# Patient Record
Sex: MALE | Race: WHITE | Employment: FULL TIME | URBAN - METROPOLITAN AREA
[De-identification: names, ages, dates, MRNs, and addresses within clinical notes are randomized per-mention and may not be internally consistent; named-entity substitution may affect disease eponyms.]

---

## 2023-05-18 ENCOUNTER — TELEPHONE (OUTPATIENT)
Age: 58
End: 2023-05-18

## 2023-05-23 ENCOUNTER — OFFICE VISIT (OUTPATIENT)
Age: 58
End: 2023-05-23
Payer: COMMERCIAL

## 2023-05-23 VITALS
SYSTOLIC BLOOD PRESSURE: 126 MMHG | RESPIRATION RATE: 20 BRPM | DIASTOLIC BLOOD PRESSURE: 82 MMHG | OXYGEN SATURATION: 97 % | HEIGHT: 70 IN | HEART RATE: 63 BPM | BODY MASS INDEX: 24.77 KG/M2 | TEMPERATURE: 97.1 F | WEIGHT: 173 LBS

## 2023-05-23 DIAGNOSIS — R10.13 EPIGASTRIC PAIN: Primary | ICD-10-CM

## 2023-05-23 PROCEDURE — 99243 OFF/OP CNSLTJ NEW/EST LOW 30: CPT | Performed by: SURGERY

## 2023-05-23 RX ORDER — ESCITALOPRAM OXALATE 5 MG/1
5 TABLET ORAL DAILY
COMMUNITY
Start: 2023-03-30

## 2023-05-23 NOTE — PROGRESS NOTES
Identified pt with two pt identifiers(name and ). Reviewed record in preparation for visit and have obtained necessary documentation. All patient medications has been reviewed. Chief Complaint   Patient presents with    Abdominal Pain     Seen at the request of Dr. Bennett hoyos gallbladder       Health Maintenance Due   Topic    COVID-19 Vaccine (1)    Depression Screen     HIV screen     Hepatitis C screen     DTaP/Tdap/Td vaccine (1 - Tdap)    Lipids     Colorectal Cancer Screen     Shingles vaccine (1 of 2)       [unfilled]    4. Have you been to the ER, urgent care clinic since your last visit? Hospitalized since your last visit? yes    5. Have you seen or consulted any other health care providers outside of the [address] [place] [address] since your last visit? Include any pap smears or colon screening. no      Patient is accompanied by self I have received verbal consent from Franky Rodriguez to discuss any/all medical information while they are present in the room.

## 2023-05-23 NOTE — PROGRESS NOTES
To: Linnette Pepper MD      From: Darryl Bennett MD    Thank you for sending Michela Denney to see us. Please note that this dictation was completed with [employer], the computer voice recognition software. Quite often unanticipated grammatical, syntax, homophones, and other interpretive errors are inadvertently transcribed by the software. Please disregard these errors. Please excuse any errors that have escaped final proofreading. Encounter Date: 5/23/2023  History and Physical    Assessment/Plan/Recommendations/Medical Decision Making:   Possible subacute cholecystitis. He had a CT done that showed a distended gallbladder with minimal pericholecystic fatty infiltration but no calcified gallstones were seen. We discussed the possibility of going straight to cholecystectomy based on the CAT scan. Other options include doing an ultrasound first to confirm that there are gallstones versus watchful waiting. He opted for an ultrasound prior to surgery. Cholecystectomy with intraoperative cholangiogram.  Discussed alternatives, risks and benefits of surgery. Risks include infection, hematoma, bleeding, bile duct or bowel injury, recurrence or persistence of symptoms, conversion to an open operation, retained CBD stones, possible FAROOQ drain, and the risks of general anesthetic. All questions were answered to the patient's satisfaction. Body mass index is 24.82 kg/m². HPI:   Patient is a 56 y.o. male who is seen in consultation at the request of Linnette Pepper MD.   Notes he has been having abdominal pain for about 3 months. He notes that last year he fell 30 feet and fortunately did not have any major injuries but did have an Achilles issue. He took a lot of ibuprofen at that point and began having some stomach issues. He later got a frozen shoulder on the right and was taking ibuprofen a lot again. Again he had issues with abdominal discomfort. He has noted some association with meals.   It

## 2023-06-08 DIAGNOSIS — R10.11 RUQ PAIN: Primary | ICD-10-CM

## 2023-06-19 ENCOUNTER — HOSPITAL ENCOUNTER (OUTPATIENT)
Facility: HOSPITAL | Age: 58
Discharge: HOME | End: 2023-06-22
Attending: SURGERY
Payer: COMMERCIAL

## 2023-06-19 DIAGNOSIS — R10.11 RUQ PAIN: ICD-10-CM

## 2023-06-19 PROCEDURE — 76700 US EXAM ABDOM COMPLETE: CPT

## 2023-06-28 ENCOUNTER — TELEPHONE (OUTPATIENT)
Age: 58
End: 2023-06-28

## 2024-11-16 ENCOUNTER — APPOINTMENT (OUTPATIENT)
Facility: HOSPITAL | Age: 59
End: 2024-11-16
Payer: COMMERCIAL

## 2024-11-16 ENCOUNTER — HOSPITAL ENCOUNTER (EMERGENCY)
Facility: HOSPITAL | Age: 59
Discharge: HOME | End: 2024-11-16
Attending: STUDENT IN AN ORGANIZED HEALTH CARE EDUCATION/TRAINING PROGRAM
Payer: COMMERCIAL

## 2024-11-16 VITALS
HEART RATE: 78 BPM | SYSTOLIC BLOOD PRESSURE: 131 MMHG | OXYGEN SATURATION: 97 % | TEMPERATURE: 97.8 F | RESPIRATION RATE: 16 BRPM | DIASTOLIC BLOOD PRESSURE: 80 MMHG

## 2024-11-16 DIAGNOSIS — S01.81XA FACIAL LACERATION, INITIAL ENCOUNTER: Primary | ICD-10-CM

## 2024-11-16 DIAGNOSIS — S09.90XD CLOSED HEAD INJURY, SUBSEQUENT ENCOUNTER: ICD-10-CM

## 2024-11-16 PROCEDURE — 2500000003 HC RX 250 WO HCPCS: Performed by: STUDENT IN AN ORGANIZED HEALTH CARE EDUCATION/TRAINING PROGRAM

## 2024-11-16 PROCEDURE — 70450 CT HEAD/BRAIN W/O DYE: CPT

## 2024-11-16 PROCEDURE — 72125 CT NECK SPINE W/O DYE: CPT

## 2024-11-16 PROCEDURE — 99284 EMERGENCY DEPT VISIT MOD MDM: CPT

## 2024-11-16 PROCEDURE — 12013 RPR F/E/E/N/L/M 2.6-5.0 CM: CPT

## 2024-11-16 RX ORDER — LIDOCAINE HYDROCHLORIDE 10 MG/ML
5 INJECTION, SOLUTION EPIDURAL; INFILTRATION; INTRACAUDAL; PERINEURAL
Status: COMPLETED | OUTPATIENT
Start: 2024-11-16 | End: 2024-11-16

## 2024-11-16 RX ADMIN — LIDOCAINE HYDROCHLORIDE 5 ML: 10 INJECTION, SOLUTION EPIDURAL; INFILTRATION; INTRACAUDAL; PERINEURAL at 14:43

## 2024-11-16 ASSESSMENT — PAIN - FUNCTIONAL ASSESSMENT: PAIN_FUNCTIONAL_ASSESSMENT: NONE - DENIES PAIN

## 2024-11-16 NOTE — DISCHARGE INSTR - COC
recorded.    Safety Concerns:     { [facility] Safety Concerns:167635163}    Impairments/Disabilities:      {[facility] Impairments/Disabilities:793474328}    Nutrition Therapy:  Current Nutrition Therapy:   {[facility] Diet List:123072029}    Routes of Feeding: {[facility] Other Feedings:610850066}  Liquids: {Slp liquid thickness:39027}  Daily Fluid Restriction: {[facility] Yes amt example:532301624}  Last Modified Barium Swallow with Video (Video Swallowing Test): {Done Not Done Date:}    Treatments at the Time of Hospital Discharge:   Respiratory Treatments: ***  Oxygen Therapy:  {Therapy; copd oxygen:06901}  Ventilator:    {[facility] Vent List:080731596}    Rehab Therapies: {THERAPEUTIC INTERVENTION:4459037833}  Weight Bearing Status/Restrictions: {[facility] Weight Bearin}  Other Medical Equipment (for information only, NOT a DME order):  {EQUIPMENT:526235556}  Other Treatments: ***    Patient's personal belongings (please select all that are sent with patient):  {[facility] Belongings:955078957}    RN SIGNATURE:  {Esignature:474860508}    CASE MANAGEMENT/SOCIAL WORK SECTION    Inpatient Status Date: ***    Readmission Risk Assessment Score:  Readmission Risk              Risk of Unplanned Readmission:  0           Discharging to Facility/ Agency   Name:   Address:  Phone:  Fax:    Dialysis Facility (if applicable)   Name:  Address:  Dialysis Schedule:  Phone:  Fax:    / signature: {Esignature:941563048}    PHYSICIAN SECTION    Prognosis: {Prognosis:5591421082}    Condition at Discharge: { Patient Condition:015477249}    Rehab Potential (if transferring to Rehab): {Prognosis:6851748745}    Recommended Labs or Other Treatments After Discharge: ***    Physician Certification: I certify the above information and transfer of Jude Mckeon  is necessary for the continuing treatment of the diagnosis listed and that he requires {Admit to Appropriate Level of Care:42648} for {GREATER/LESS:801091629} 30

## 2024-11-16 NOTE — ED NOTES
Patient gave verbal understanding of dc paperwork and how to care for stitches/wounds. Patient ambulated from ed without incident.
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
## 2025-05-12 ENCOUNTER — APPOINTMENT (OUTPATIENT)
Facility: HOSPITAL | Age: 60
DRG: 392 | End: 2025-05-12
Payer: COMMERCIAL

## 2025-05-12 ENCOUNTER — HOSPITAL ENCOUNTER (INPATIENT)
Facility: HOSPITAL | Age: 60
LOS: 3 days | Discharge: HOME | DRG: 392 | End: 2025-05-15
Attending: EMERGENCY MEDICINE | Admitting: INTERNAL MEDICINE
Payer: COMMERCIAL

## 2025-05-12 DIAGNOSIS — K57.80 DIVERTICULITIS OF INTESTINE WITH ABSCESS WITHOUT BLEEDING, UNSPECIFIED PART OF INTESTINAL TRACT: Primary | ICD-10-CM

## 2025-05-12 DIAGNOSIS — D72.829 LEUKOCYTOSIS, UNSPECIFIED TYPE: ICD-10-CM

## 2025-05-12 PROBLEM — K57.32 DIVERTICULITIS LARGE INTESTINE: Status: ACTIVE | Noted: 2025-05-12

## 2025-05-12 PROBLEM — K57.20 DIVERTICULITIS OF LARGE INTESTINE WITH ABSCESS WITHOUT BLEEDING: Status: ACTIVE | Noted: 2025-05-12

## 2025-05-12 LAB
ALBUMIN SERPL-MCNC: 3.6 G/DL (ref 3.5–5)
ALBUMIN/GLOB SERPL: 0.8 (ref 1.1–2.2)
ALP SERPL-CCNC: 88 U/L (ref 45–117)
ALT SERPL-CCNC: 21 U/L (ref 12–78)
ANION GAP SERPL CALC-SCNC: 4 MMOL/L (ref 2–12)
APPEARANCE UR: CLEAR
AST SERPL-CCNC: 20 U/L (ref 15–37)
BACTERIA URNS QL MICRO: NEGATIVE /HPF
BASOPHILS # BLD: 0.05 K/UL (ref 0–0.1)
BASOPHILS NFR BLD: 0.3 % (ref 0–1)
BILIRUB SERPL-MCNC: 1 MG/DL (ref 0.2–1)
BILIRUB UR QL: NEGATIVE
BUN SERPL-MCNC: 9 MG/DL (ref 6–20)
BUN/CREAT SERPL: 8 (ref 12–20)
CALCIUM SERPL-MCNC: 9.5 MG/DL (ref 8.5–10.1)
CHLORIDE SERPL-SCNC: 99 MMOL/L (ref 97–108)
CO2 SERPL-SCNC: 29 MMOL/L (ref 21–32)
COLOR UR: ABNORMAL
COMMENT:: NORMAL
CREAT SERPL-MCNC: 1.11 MG/DL (ref 0.7–1.3)
DIFFERENTIAL METHOD BLD: ABNORMAL
EOSINOPHIL # BLD: 0.06 K/UL (ref 0–0.4)
EOSINOPHIL NFR BLD: 0.4 % (ref 0–7)
EPITH CASTS URNS QL MICRO: ABNORMAL /LPF
ERYTHROCYTE [DISTWIDTH] IN BLOOD BY AUTOMATED COUNT: 11.9 % (ref 11.5–14.5)
GLOBULIN SER CALC-MCNC: 4.5 G/DL (ref 2–4)
GLUCOSE BLD STRIP.AUTO-MCNC: 110 MG/DL (ref 65–117)
GLUCOSE SERPL-MCNC: 94 MG/DL (ref 65–100)
GLUCOSE UR STRIP.AUTO-MCNC: NEGATIVE MG/DL
HCT VFR BLD AUTO: 44.8 % (ref 36.6–50.3)
HEMOCCULT STL QL: NEGATIVE
HGB BLD-MCNC: 15.6 G/DL (ref 12.1–17)
HGB UR QL STRIP: ABNORMAL
HYALINE CASTS URNS QL MICRO: ABNORMAL /LPF (ref 0–5)
IMM GRANULOCYTES # BLD AUTO: 0.07 K/UL (ref 0–0.04)
IMM GRANULOCYTES NFR BLD AUTO: 0.5 % (ref 0–0.5)
KETONES UR QL STRIP.AUTO: 80 MG/DL
LEUKOCYTE ESTERASE UR QL STRIP.AUTO: NEGATIVE
LIPASE SERPL-CCNC: 23 U/L (ref 13–75)
LYMPHOCYTES # BLD: 1.39 K/UL (ref 0.8–3.5)
LYMPHOCYTES NFR BLD: 9.7 % (ref 12–49)
MCH RBC QN AUTO: 29.7 PG (ref 26–34)
MCHC RBC AUTO-ENTMCNC: 34.8 G/DL (ref 30–36.5)
MCV RBC AUTO: 85.2 FL (ref 80–99)
MONOCYTES # BLD: 1.53 K/UL (ref 0–1)
MONOCYTES NFR BLD: 10.6 % (ref 5–13)
NEUTS SEG # BLD: 11.29 K/UL (ref 1.8–8)
NEUTS SEG NFR BLD: 78.5 % (ref 32–75)
NITRITE UR QL STRIP.AUTO: NEGATIVE
NRBC # BLD: 0 K/UL (ref 0–0.01)
NRBC BLD-RTO: 0 PER 100 WBC
PH UR STRIP: 6 (ref 5–8)
PLATELET # BLD AUTO: 266 K/UL (ref 150–400)
PMV BLD AUTO: 9.3 FL (ref 8.9–12.9)
POTASSIUM SERPL-SCNC: 3.8 MMOL/L (ref 3.5–5.1)
PROT SERPL-MCNC: 8.1 G/DL (ref 6.4–8.2)
PROT UR STRIP-MCNC: ABNORMAL MG/DL
RBC # BLD AUTO: 5.26 M/UL (ref 4.1–5.7)
RBC #/AREA URNS HPF: ABNORMAL /HPF (ref 0–5)
SERVICE CMNT-IMP: NORMAL
SODIUM SERPL-SCNC: 132 MMOL/L (ref 136–145)
SP GR UR REFRACTOMETRY: 1.02 (ref 1–1.03)
SPECIMEN HOLD: NORMAL
SPECIMEN HOLD: NORMAL
UROBILINOGEN UR QL STRIP.AUTO: 0.2 EU/DL (ref 0.2–1)
WBC # BLD AUTO: 14.4 K/UL (ref 4.1–11.1)
WBC URNS QL MICRO: ABNORMAL /HPF (ref 0–4)

## 2025-05-12 PROCEDURE — 81001 URINALYSIS AUTO W/SCOPE: CPT

## 2025-05-12 PROCEDURE — 82272 OCCULT BLD FECES 1-3 TESTS: CPT

## 2025-05-12 PROCEDURE — 99221 1ST HOSP IP/OBS SF/LOW 40: CPT | Performed by: NURSE PRACTITIONER

## 2025-05-12 PROCEDURE — 2580000003 HC RX 258: Performed by: INTERNAL MEDICINE

## 2025-05-12 PROCEDURE — 89055 LEUKOCYTE ASSESSMENT FECAL: CPT

## 2025-05-12 PROCEDURE — 6360000002 HC RX W HCPCS: Performed by: INTERNAL MEDICINE

## 2025-05-12 PROCEDURE — 99285 EMERGENCY DEPT VISIT HI MDM: CPT

## 2025-05-12 PROCEDURE — 6360000002 HC RX W HCPCS: Performed by: EMERGENCY MEDICINE

## 2025-05-12 PROCEDURE — 1200000000 HC SEMI PRIVATE

## 2025-05-12 PROCEDURE — 87449 NOS EACH ORGANISM AG IA: CPT

## 2025-05-12 PROCEDURE — 74177 CT ABD & PELVIS W/CONTRAST: CPT

## 2025-05-12 PROCEDURE — 87324 CLOSTRIDIUM AG IA: CPT

## 2025-05-12 PROCEDURE — 96374 THER/PROPH/DIAG INJ IV PUSH: CPT

## 2025-05-12 PROCEDURE — 87506 IADNA-DNA/RNA PROBE TQ 6-11: CPT

## 2025-05-12 PROCEDURE — 6360000004 HC RX CONTRAST MEDICATION: Performed by: RADIOLOGY

## 2025-05-12 PROCEDURE — 80053 COMPREHEN METABOLIC PANEL: CPT

## 2025-05-12 PROCEDURE — 83690 ASSAY OF LIPASE: CPT

## 2025-05-12 PROCEDURE — 87040 BLOOD CULTURE FOR BACTERIA: CPT

## 2025-05-12 PROCEDURE — 2500000003 HC RX 250 WO HCPCS: Performed by: INTERNAL MEDICINE

## 2025-05-12 PROCEDURE — 85025 COMPLETE CBC W/AUTO DIFF WBC: CPT

## 2025-05-12 PROCEDURE — 2580000003 HC RX 258: Performed by: EMERGENCY MEDICINE

## 2025-05-12 PROCEDURE — 6370000000 HC RX 637 (ALT 250 FOR IP): Performed by: INTERNAL MEDICINE

## 2025-05-12 PROCEDURE — 82962 GLUCOSE BLOOD TEST: CPT

## 2025-05-12 RX ORDER — ONDANSETRON 2 MG/ML
4 INJECTION INTRAMUSCULAR; INTRAVENOUS EVERY 6 HOURS PRN
Status: DISCONTINUED | OUTPATIENT
Start: 2025-05-12 | End: 2025-05-15 | Stop reason: HOSPADM

## 2025-05-12 RX ORDER — SODIUM CHLORIDE 9 MG/ML
INJECTION, SOLUTION INTRAVENOUS CONTINUOUS
Status: DISPENSED | OUTPATIENT
Start: 2025-05-12 | End: 2025-05-13

## 2025-05-12 RX ORDER — POTASSIUM CHLORIDE 750 MG/1
40 TABLET, EXTENDED RELEASE ORAL PRN
Status: CANCELLED | OUTPATIENT
Start: 2025-05-12

## 2025-05-12 RX ORDER — ACETAMINOPHEN 650 MG/1
650 SUPPOSITORY RECTAL EVERY 6 HOURS PRN
Status: DISCONTINUED | OUTPATIENT
Start: 2025-05-12 | End: 2025-05-15 | Stop reason: HOSPADM

## 2025-05-12 RX ORDER — POTASSIUM CHLORIDE 7.45 MG/ML
10 INJECTION INTRAVENOUS PRN
Status: CANCELLED | OUTPATIENT
Start: 2025-05-12

## 2025-05-12 RX ORDER — ACETAMINOPHEN 325 MG/1
650 TABLET ORAL EVERY 6 HOURS PRN
Status: DISCONTINUED | OUTPATIENT
Start: 2025-05-12 | End: 2025-05-15 | Stop reason: HOSPADM

## 2025-05-12 RX ORDER — SODIUM CHLORIDE 0.9 % (FLUSH) 0.9 %
5-40 SYRINGE (ML) INJECTION PRN
Status: DISCONTINUED | OUTPATIENT
Start: 2025-05-12 | End: 2025-05-15 | Stop reason: HOSPADM

## 2025-05-12 RX ORDER — IOPAMIDOL 755 MG/ML
100 INJECTION, SOLUTION INTRAVASCULAR
Status: COMPLETED | OUTPATIENT
Start: 2025-05-12 | End: 2025-05-12

## 2025-05-12 RX ORDER — SODIUM CHLORIDE 9 MG/ML
INJECTION, SOLUTION INTRAVENOUS PRN
Status: DISCONTINUED | OUTPATIENT
Start: 2025-05-12 | End: 2025-05-15 | Stop reason: HOSPADM

## 2025-05-12 RX ORDER — ESCITALOPRAM OXALATE 10 MG/1
5 TABLET ORAL DAILY
Status: DISCONTINUED | OUTPATIENT
Start: 2025-05-13 | End: 2025-05-15 | Stop reason: HOSPADM

## 2025-05-12 RX ORDER — ONDANSETRON 4 MG/1
4 TABLET, ORALLY DISINTEGRATING ORAL EVERY 8 HOURS PRN
Status: CANCELLED | OUTPATIENT
Start: 2025-05-12

## 2025-05-12 RX ORDER — NALOXONE HYDROCHLORIDE 0.4 MG/ML
0.4 INJECTION, SOLUTION INTRAMUSCULAR; INTRAVENOUS; SUBCUTANEOUS PRN
Status: DISCONTINUED | OUTPATIENT
Start: 2025-05-12 | End: 2025-05-15 | Stop reason: HOSPADM

## 2025-05-12 RX ORDER — SODIUM CHLORIDE 0.9 % (FLUSH) 0.9 %
5-40 SYRINGE (ML) INJECTION EVERY 12 HOURS SCHEDULED
Status: DISCONTINUED | OUTPATIENT
Start: 2025-05-12 | End: 2025-05-15 | Stop reason: HOSPADM

## 2025-05-12 RX ORDER — POLYETHYLENE GLYCOL 3350 17 G/17G
17 POWDER, FOR SOLUTION ORAL DAILY PRN
Status: DISCONTINUED | OUTPATIENT
Start: 2025-05-12 | End: 2025-05-15 | Stop reason: HOSPADM

## 2025-05-12 RX ORDER — MORPHINE SULFATE 2 MG/ML
2 INJECTION, SOLUTION INTRAMUSCULAR; INTRAVENOUS EVERY 4 HOURS PRN
Refills: 0 | Status: DISCONTINUED | OUTPATIENT
Start: 2025-05-12 | End: 2025-05-15 | Stop reason: HOSPADM

## 2025-05-12 RX ORDER — MAGNESIUM SULFATE IN WATER 40 MG/ML
2000 INJECTION, SOLUTION INTRAVENOUS PRN
Status: CANCELLED | OUTPATIENT
Start: 2025-05-12

## 2025-05-12 RX ORDER — METRONIDAZOLE 500 MG/100ML
500 INJECTION, SOLUTION INTRAVENOUS EVERY 8 HOURS
Status: DISCONTINUED | OUTPATIENT
Start: 2025-05-12 | End: 2025-05-15 | Stop reason: HOSPADM

## 2025-05-12 RX ADMIN — Medication 10 ML: at 22:00

## 2025-05-12 RX ADMIN — PIPERACILLIN AND TAZOBACTAM 4500 MG: 4; .5 INJECTION, POWDER, LYOPHILIZED, FOR SOLUTION INTRAVENOUS at 14:12

## 2025-05-12 RX ADMIN — ACETAMINOPHEN 650 MG: 325 TABLET ORAL at 20:28

## 2025-05-12 RX ADMIN — METRONIDAZOLE 500 MG: 5 INJECTION, SOLUTION INTRAVENOUS at 20:34

## 2025-05-12 RX ADMIN — IOPAMIDOL 100 ML: 755 INJECTION, SOLUTION INTRAVENOUS at 12:23

## 2025-05-12 RX ADMIN — CEFTRIAXONE SODIUM 1000 MG: 1 INJECTION, POWDER, FOR SOLUTION INTRAMUSCULAR; INTRAVENOUS at 20:28

## 2025-05-12 RX ADMIN — SODIUM CHLORIDE: 0.9 INJECTION, SOLUTION INTRAVENOUS at 16:32

## 2025-05-12 ASSESSMENT — PAIN DESCRIPTION - ORIENTATION: ORIENTATION: LOWER

## 2025-05-12 ASSESSMENT — PAIN DESCRIPTION - DESCRIPTORS: DESCRIPTORS: OTHER (COMMENT)

## 2025-05-12 ASSESSMENT — PAIN SCALES - GENERAL: PAINLEVEL_OUTOF10: 2

## 2025-05-12 ASSESSMENT — PAIN DESCRIPTION - LOCATION: LOCATION: ABDOMEN

## 2025-05-12 ASSESSMENT — PAIN - FUNCTIONAL ASSESSMENT
PAIN_FUNCTIONAL_ASSESSMENT: PREVENTS OR INTERFERES SOME ACTIVE ACTIVITIES AND ADLS
PAIN_FUNCTIONAL_ASSESSMENT: 0-10

## 2025-05-12 NOTE — ED TRIAGE NOTES
Pt c/o abd pain since last week with fever and chills , seen at pt first, had diarrhea, +left lower abd pain, denies n/v, denies urinary symptoms , fever of 101

## 2025-05-12 NOTE — ED PROVIDER NOTES
[facility] EMERGENCY DEPARTMENT  EMERGENCY DEPARTMENT ENCOUNTER      Pt Name: Jude Mckeon  MRN: [id number]  Birthdate 1965  Date of evaluation: 5/12/2025  Provider: Jean Carlos Reyes DO      HISTORY OF PRESENT ILLNESS      HPI  Otherwise healthy 59-year-old male with no prior abdominal surgeries presents with increasing abdominal pain for the last week with associated fever and chills.  He reports left and right lower abdominal pain and has had some diarrhea but no nausea or vomiting or urinary symptoms.  Reports fever 101 last night.      Nursing Notes were reviewed.    REVIEW OF SYSTEMS         Review of Systems        PAST MEDICAL HISTORY     Past Medical History:   Diagnosis Date    Cancer (HCC)     skin         SURGICAL HISTORY     No past surgical history on file.      CURRENT MEDICATIONS       Previous Medications    ESCITALOPRAM (LEXAPRO) 5 MG TABLET    Take 1 tablet by mouth daily       ALLERGIES     Patient has no known allergies.    FAMILY HISTORY     No family history on file.       SOCIAL HISTORY       Social History     Socioeconomic History    Marital status:    Tobacco Use    Smoking status: Never    Smokeless tobacco: Current    Tobacco comments:     pouches   Vaping Use    Vaping status: Never Used   Substance and Sexual Activity    Alcohol use: Yes     Comment: weekends    Drug use: Never         PHYSICAL EXAM       ED Triage Vitals   BP Systolic BP Percentile Diastolic BP Percentile Temp Temp src Pulse Resp SpO2   -- -- -- -- -- -- -- --      Height Weight         -- --             Body mass index is 25.62 kg/m².    Physical Exam  Vitals and nursing note reviewed.   Constitutional:       General: He is not in acute distress.     Appearance: Normal appearance. He is not ill-appearing.   HENT:      Head: Normocephalic and atraumatic.      Nose: Nose normal.      Mouth/Throat:      Mouth: Mucous membranes are moist.   Eyes:      Extraocular Movements: Extraocular movements intact.

## 2025-05-12 NOTE — CONSULTS
[facility]  ER consult    Admit Date: 5/12/2025  Reason for Consultation: diverticulitis w/ abscess    HPI:  Jude Mckeon is a 59 y.o. male w/ no PMHx or PSHx presents to the ED w/ c/o lower abd pain for a week.  He had diarrhea last week and intermittent pain.  The pain went away on Friday and Saturday.  Yesterday he felt worse and spiked a fever.  No n/v.  No diverticulitis in past.  Colonoscopy at age 52, says it was nl.      CT  IMPRESSION:  Proximal sigmoid colonic diverticulitis. 2.4 cm x 3.9 cm coalescing  diverticular abscess, component of which is intramural      Patient Active Problem List    Diagnosis Date Noted    Diverticulitis large intestine 05/12/2025     Past Medical History:   Diagnosis Date    Cancer (HCC)     skin      No past surgical history on file.   Social History     Tobacco Use    Smoking status: Never    Smokeless tobacco: Current    Tobacco comments:     pouches   Substance Use Topics    Alcohol use: Yes     Comment: weekends      No family history on file.   Prior to Admission medications    Medication Sig Start Date End Date Taking? Authorizing Provider   escitalopram (LEXAPRO) 5 MG tablet Take 1 tablet by mouth daily 3/30/23   ProviderAlexis MD     No Known Allergies       Subjective:     Review of Systems:    A comprehensive review of systems was negative except for that written in the History of Present Illness.       Objective:     Blood pressure (!) 145/94, pulse 88, temperature 97.9 °F (36.6 °C), temperature source Oral, resp. rate 16, weight 81 kg (178 lb 9.2 oz), SpO2 98%.  Recent Results (from the past 24 hours)   CBC with Auto Differential    Collection Time: 05/12/25 11:31 AM   Result Value Ref Range    WBC 14.4 (H) 4.1 - 11.1 K/uL    RBC 5.26 4.10 - 5.70 M/uL    Hemoglobin 15.6 12.1 - 17.0 g/dL    Hematocrit 44.8 36.6 - 50.3 %    MCV 85.2 80.0 - 99.0 FL    MCH 29.7 26.0 - 34.0 PG    MCHC 34.8 30.0 - 36.5 g/dL    RDW 11.9 11.5 - 14.5 %    Platelets 266 150

## 2025-05-12 NOTE — H&P
History and Physical    Date of Service:  5/12/2025  Primary Care Provider: Mejia Leonardo MD  Source of information: The patient and Chart review    Chief Complaint: Abdominal Pain      History of Presenting Illness:   Jude Mckeon is a 59 y.o. male with depression and h/o skin CA who presented with abdominal pain, fever, chills, nonbloody diarrhea x1 week. No previous episodes, sick contacts. He had a fever of 101 yesterday. 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 He had a normal colonoscopy 7 years ago at age 52. He denies headaches, lightheadedness, dizziness, fall, injuries, LOC, weight changes, CP, SOB, cough, dysuria.      REVIEW OF SYSTEMS:  Pertinent items are noted in the History of Present Illness.     Past Medical History:   Diagnosis Date    Cancer (HCC)     skin      No past surgical history on file.  Prior to Admission medications    Medication Sig Start Date End Date Taking? Authorizing Provider   escitalopram (LEXAPRO) 5 MG tablet Take 1 tablet by mouth daily 3/30/23   Provider, MD Alexis     No Known Allergies   No family history on file.   Social History:  reports that he has never smoked. He uses smokeless tobacco. He reports current alcohol use. He reports that he does not use drugs.   Social Drivers of Health     Tobacco Use: High Risk (5/26/2023)    Patient History     Smoking Tobacco Use: Never     Smokeless Tobacco Use: Current     Passive Exposure: Not on file   Alcohol Use: Not on file   Financial Resource Strain: Not on file   Food Insecurity: Not on file   Transportation Needs: Not on file   Physical Activity: Not on file   Stress: Not on file   Social Connections: Not on file   Intimate Partner Violence: Not on file   Depression: Not on file   Housing Stability: Not on file   Interpersonal Safety: Not on file   Utilities: Not on file        Medications were reconciled to the best of my ability given all available resources at the time of admission. Route is PO

## 2025-05-12 NOTE — ED NOTES
ED TO INPATIENT SBAR HANDOFF    Patient Name: Jude Mckeon   :  1965  59 y.o.   MRN:  [id number]  ED Room #:  R35/R35     Situation  Code Status: Full Code   Allergies: Patient has no known allergies.  Weight: Patient Vitals for the past 96 hrs (Last 3 readings):   Weight   25 1124 81 kg (178 lb 9.2 oz)       Arrived from: home    Chief Complaint:   Chief Complaint   Patient presents with    Abdominal Pain       Hospital Problem/Diagnosis:  Principal Problem:    Diverticulitis of large intestine with abscess without bleeding  Active Problems:    Diverticulitis large intestine  Resolved Problems:    * No resolved hospital problems. *      Mobility: no current mobility problem   ED Fall Risk: Presents to emergency department  because of falls (Syncope, seizure, or loss of consciousness): No, Age > 70: No, Altered Mental Status, Intoxication with alcohol or substance confusion (Disorientation, impaired judgment, poor safety awaremess, or inability to follow instructions): No, Impaired Mobility: Ambulates or transfers with assistive devices or assistance; Unable to ambulate or transer.: No, Nursing Judgement: No   Fell in ED or prior to admission: no   Restraints: no     Sitter: no   Family/Caregiver Present: no    Neet to know social/safety information: None    Background  History:   Past Medical History:   Diagnosis Date    Cancer (HCC)     skin       Assessment    Abnormal Assessment Findings: Abdominal pain  Imaging:   CT ABDOMEN PELVIS W IV CONTRAST Additional Contrast? None   Final Result   Proximal sigmoid colonic diverticulitis. 2.4 cm x 3.9 cm coalescing   diverticular abscess, component of which is intramural      Electronically signed by Aaron Adler MD        Abnormal labs:   Abnormal Labs Reviewed   URINALYSIS WITH MICROSCOPIC - Abnormal; Notable for the following components:       Result Value    Protein, UA TRACE (*)     Ketones, Urine 80 (*)     Blood, Urine TRACE (*)     All other

## 2025-05-13 LAB
ANION GAP SERPL CALC-SCNC: 7 MMOL/L (ref 2–12)
BASOPHILS # BLD: 0.07 K/UL (ref 0–0.1)
BASOPHILS NFR BLD: 0.5 % (ref 0–1)
BUN SERPL-MCNC: 6 MG/DL (ref 6–20)
BUN/CREAT SERPL: 7 (ref 12–20)
C COLI+JEJUNI TUF STL QL NAA+PROBE: NEGATIVE
C DIFF GDH STL QL: NEGATIVE
C DIFF TOX A+B STL QL IA: NEGATIVE
C DIFF TOXIN INTERPRETATION: NORMAL
CALCIUM SERPL-MCNC: 9.1 MG/DL (ref 8.5–10.1)
CHLORIDE SERPL-SCNC: 105 MMOL/L (ref 97–108)
CO2 SERPL-SCNC: 23 MMOL/L (ref 21–32)
CREAT SERPL-MCNC: 0.88 MG/DL (ref 0.7–1.3)
DIFFERENTIAL METHOD BLD: ABNORMAL
EC STX1+STX2 GENES STL QL NAA+PROBE: NEGATIVE
EOSINOPHIL # BLD: 0.1 K/UL (ref 0–0.4)
EOSINOPHIL NFR BLD: 0.7 % (ref 0–7)
ERYTHROCYTE [DISTWIDTH] IN BLOOD BY AUTOMATED COUNT: 11.9 % (ref 11.5–14.5)
ETEC ELTA+ESTB GENES STL QL NAA+PROBE: NEGATIVE
GLUCOSE SERPL-MCNC: 94 MG/DL (ref 65–100)
HCT VFR BLD AUTO: 39.9 % (ref 36.6–50.3)
HGB BLD-MCNC: 14 G/DL (ref 12.1–17)
IMM GRANULOCYTES # BLD AUTO: 0.07 K/UL (ref 0–0.04)
IMM GRANULOCYTES NFR BLD AUTO: 0.5 % (ref 0–0.5)
LYMPHOCYTES # BLD: 1.28 K/UL (ref 0.8–3.5)
LYMPHOCYTES NFR BLD: 8.8 % (ref 12–49)
MCH RBC QN AUTO: 29.6 PG (ref 26–34)
MCHC RBC AUTO-ENTMCNC: 35.1 G/DL (ref 30–36.5)
MCV RBC AUTO: 84.4 FL (ref 80–99)
MONOCYTES # BLD: 1.59 K/UL (ref 0–1)
MONOCYTES NFR BLD: 10.9 % (ref 5–13)
NEUTS SEG # BLD: 11.43 K/UL (ref 1.8–8)
NEUTS SEG NFR BLD: 78.6 % (ref 32–75)
NRBC # BLD: 0 K/UL (ref 0–0.01)
NRBC BLD-RTO: 0 PER 100 WBC
P SHIGELLOIDES DNA STL QL NAA+PROBE: NEGATIVE
PLATELET # BLD AUTO: 252 K/UL (ref 150–400)
PMV BLD AUTO: 9.3 FL (ref 8.9–12.9)
POTASSIUM SERPL-SCNC: 3.9 MMOL/L (ref 3.5–5.1)
RBC # BLD AUTO: 4.73 M/UL (ref 4.1–5.7)
SALMONELLA SP SPAO STL QL NAA+PROBE: NEGATIVE
SHIGELLA SP+EIEC IPAH STL QL NAA+PROBE: NEGATIVE
SODIUM SERPL-SCNC: 135 MMOL/L (ref 136–145)
V CHOL+PARA+VUL DNA STL QL NAA+NON-PROBE: NEGATIVE
WBC # BLD AUTO: 14.5 K/UL (ref 4.1–11.1)
Y ENTEROCOL DNA STL QL NAA+NON-PROBE: NEGATIVE

## 2025-05-13 PROCEDURE — 80048 BASIC METABOLIC PNL TOTAL CA: CPT

## 2025-05-13 PROCEDURE — 1200000000 HC SEMI PRIVATE

## 2025-05-13 PROCEDURE — 6370000000 HC RX 637 (ALT 250 FOR IP): Performed by: INTERNAL MEDICINE

## 2025-05-13 PROCEDURE — 2500000003 HC RX 250 WO HCPCS: Performed by: INTERNAL MEDICINE

## 2025-05-13 PROCEDURE — 99231 SBSQ HOSP IP/OBS SF/LOW 25: CPT | Performed by: SURGERY

## 2025-05-13 PROCEDURE — 85025 COMPLETE CBC W/AUTO DIFF WBC: CPT

## 2025-05-13 PROCEDURE — 6360000002 HC RX W HCPCS: Performed by: INTERNAL MEDICINE

## 2025-05-13 RX ADMIN — METRONIDAZOLE 500 MG: 5 INJECTION, SOLUTION INTRAVENOUS at 04:03

## 2025-05-13 RX ADMIN — ESCITALOPRAM OXALATE 5 MG: 10 TABLET ORAL at 10:43

## 2025-05-13 RX ADMIN — CEFTRIAXONE SODIUM 1000 MG: 1 INJECTION, POWDER, FOR SOLUTION INTRAMUSCULAR; INTRAVENOUS at 19:40

## 2025-05-13 RX ADMIN — METRONIDAZOLE 500 MG: 5 INJECTION, SOLUTION INTRAVENOUS at 19:39

## 2025-05-13 RX ADMIN — METRONIDAZOLE 500 MG: 5 INJECTION, SOLUTION INTRAVENOUS at 13:06

## 2025-05-13 RX ADMIN — Medication 10 ML: at 20:31

## 2025-05-13 ASSESSMENT — PAIN SCALES - GENERAL
PAINLEVEL_OUTOF10: 3
PAINLEVEL_OUTOF10: 2
PAINLEVEL_OUTOF10: 3

## 2025-05-13 ASSESSMENT — PAIN DESCRIPTION - LOCATION
LOCATION: ABDOMEN

## 2025-05-13 ASSESSMENT — PAIN DESCRIPTION - ORIENTATION
ORIENTATION: LOWER

## 2025-05-13 ASSESSMENT — PAIN DESCRIPTION - DESCRIPTORS: DESCRIPTORS: OTHER (COMMENT)

## 2025-05-13 ASSESSMENT — PAIN DESCRIPTION - PAIN TYPE
TYPE: ACUTE PAIN
TYPE: ACUTE PAIN

## 2025-05-13 NOTE — PROGRESS NOTES
[facility]                                                                                          Hospitalist Progress Note  GUI Hennessy - CNP  Office Phone: [phone]        Date of Service:  2025  NAME:  Jude Mckeon  :  1965  MRN:  [id number]       Admission Summary:   Jude Mckeon is a 59 y.o. male with depression and h/o skin CA who presented with abdominal pain, fever, chills, nonbloody diarrhea x1 week. No previous episodes, sick contacts. He had a fever of 101 yesterday. 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 He had a normal colonoscopy 7 years ago at age 52. He denies headaches, lightheadedness, dizziness, fall, injuries, LOC, weight changes, CP, SOB, cough, dysuria.        Interval history / Subjective:   Patient seen earlier today resting in bed in NAD.  Still having lower abdominal pain but some improvement since yesterday.       Assessment & Plan:     Diverticulitis of large intestine with abscess without bleeding  Leukocytosis  - General Surgery consulted by ED MD and recommended conservative management: no surgical intervention at this time  - prn pain and nausea meds  - advance to full liquids today   - BC  NGTD <24 hours   - s/p empiric Zosyn x1 in the ED  - start CTX/metronidazole x7 days  - fecal leuk, c diff, enteric panel pending  - FOBT negative   - will need repeat CT on 5/15 per surgery (ordered)      Hyponatremia, mild at 132  - cont IVF  - improved to 135  - unlikely to be due to escitalopram but pt may need to d/w PCP     Depression  - continue home escitalopram      H/o skin CA          Code status: Full  Prophylaxis: SCD  Care Plan discussed with: patient, nursing, attending    Anticipated Disposition: home when improved, >48 hours as CT has been ordered for 5/15  Inpatient  Cardiac monitoring: None  Central Line:            Social Drivers of Health     Tobacco Use: High Risk (2023)

## 2025-05-13 NOTE — CARE COORDINATION
Care Management Initial Assessment       RUR:6%  Readmission? No  1st IM letter given? No-n/a  1st  letter given: No     05/13/25 1424   Service Assessment   Patient Orientation Alert and Oriented   Cognition Alert   History Provided By Medical Record   Primary Caregiver Self   Support Systems Spouse/Significant Other   PCP Verified by CM Yes   Prior Functional Level Independent in ADLs/IADLs   Current Functional Level Independent in ADLs/IADLs   Can patient return to prior living arrangement Yes   Ability to make needs known: Good   Family able to assist with home care needs: Yes   Would you like for me to discuss the discharge plan with any other family members/significant others, and if so, who? No   Financial Resources None     CM reviewed pt's chart. CM noted per MD note that this pt lives at home with his wife. He was independent with all of his ADL's and very active. No needs identified for CM. Natasha Resendiz,BSW,ACM-SW

## 2025-05-13 NOTE — PROGRESS NOTES
Progress Note  Date:2025       Room:[facility]  Patient Name:Jude Mckeon     YOB: 1965     Age:59 y.o.        Subjective    Subjective   Review of Systems  Patient states he is feeling a little better today.  Still with some pain.  Objective         Vitals Last 24 Hours:  TEMPERATURE:  Temp  Av.5 °F (36.9 °C)  Min: 97.7 °F (36.5 °C)  Max: 100.2 °F (37.9 °C)  RESPIRATIONS RANGE: Resp  Av.7  Min: 16  Max: 22  PULSE OXIMETRY RANGE: SpO2  Av.2 %  Min: 94 %  Max: 100 %  PULSE RANGE: Pulse  Av.2  Min: 68  Max: 87  BLOOD PRESSURE RANGE: Systolic (24hrs), Av , Min:112 , Max:126   ; Diastolic (24hrs), Av, Min:68, Max:76    I/O (24Hr):  No intake or output data in the 24 hours ending 25 1222  Objective:  Vital signs: (most recent): Blood pressure 112/68, pulse 75, temperature 97.7 °F (36.5 °C), temperature source Oral, resp. rate 18, weight 81 kg (178 lb 9.2 oz), SpO2 100%.    General alert no acute distress  Abdomen soft still with some left lower quadrant and suprapubic tenderness no rebound or guarding  Labs/Imaging/Diagnostics    Labs:  CBC:  Recent Labs     25  1131 25  0434   WBC 14.4* 14.5*   RBC 5.26 4.73   HGB 15.6 14.0   HCT 44.8 39.9   MCV 85.2 84.4   RDW 11.9 11.9    252     CHEMISTRIES:  Recent Labs     25  1131 25  0434   * 135*   K 3.8 3.9   CL 99 105   CO2 29 23   BUN 9 6   CREATININE 1.11 0.88   GLUCOSE 94 94     PT/INR:No results for input(s): \"PROTIME\", \"INR\" in the last 72 hours.  APTT:No results for input(s): \"APTT\" in the last 72 hours.  LIVER PROFILE:  Recent Labs     25  1131   AST 20   ALT 21   BILITOT 1.0   ALKPHOS 88       Imaging Last 24 Hours:  CT ABDOMEN PELVIS W IV CONTRAST Additional Contrast? None  Result Date: 2025  INDICATION: Lower abdominal pain. CT of the abdomen and pelvis is performed with 5 mm collimation. Study is performed with 100 cc of nonionic Isovue 370. Sagittal and coronal  Performed Resulted

## 2025-05-14 LAB
ANION GAP SERPL CALC-SCNC: 9 MMOL/L (ref 2–12)
BASOPHILS # BLD: 0.06 K/UL (ref 0–0.1)
BASOPHILS NFR BLD: 0.9 % (ref 0–1)
BUN SERPL-MCNC: 5 MG/DL (ref 6–20)
BUN/CREAT SERPL: 6 (ref 12–20)
CALCIUM SERPL-MCNC: 8.9 MG/DL (ref 8.5–10.1)
CHLORIDE SERPL-SCNC: 109 MMOL/L (ref 97–108)
CO2 SERPL-SCNC: 20 MMOL/L (ref 21–32)
CREAT SERPL-MCNC: 0.79 MG/DL (ref 0.7–1.3)
DIFFERENTIAL METHOD BLD: NORMAL
EOSINOPHIL # BLD: 0.16 K/UL (ref 0–0.4)
EOSINOPHIL NFR BLD: 2.4 % (ref 0–7)
ERYTHROCYTE [DISTWIDTH] IN BLOOD BY AUTOMATED COUNT: 11.8 % (ref 11.5–14.5)
GLUCOSE SERPL-MCNC: 99 MG/DL (ref 65–100)
HCT VFR BLD AUTO: 38.7 % (ref 36.6–50.3)
HGB BLD-MCNC: 13.3 G/DL (ref 12.1–17)
IMM GRANULOCYTES # BLD AUTO: 0.03 K/UL (ref 0–0.04)
IMM GRANULOCYTES NFR BLD AUTO: 0.5 % (ref 0–0.5)
LYMPHOCYTES # BLD: 1.67 K/UL (ref 0.8–3.5)
LYMPHOCYTES NFR BLD: 25.3 % (ref 12–49)
MCH RBC QN AUTO: 29.4 PG (ref 26–34)
MCHC RBC AUTO-ENTMCNC: 34.4 G/DL (ref 30–36.5)
MCV RBC AUTO: 85.6 FL (ref 80–99)
MONOCYTES # BLD: 0.75 K/UL (ref 0–1)
MONOCYTES NFR BLD: 11.4 % (ref 5–13)
NEUTS SEG # BLD: 3.93 K/UL (ref 1.8–8)
NEUTS SEG NFR BLD: 59.5 % (ref 32–75)
NRBC # BLD: 0 K/UL (ref 0–0.01)
NRBC BLD-RTO: 0 PER 100 WBC
PLATELET # BLD AUTO: 245 K/UL (ref 150–400)
POTASSIUM SERPL-SCNC: 4.6 MMOL/L (ref 3.5–5.1)
RBC # BLD AUTO: 4.52 M/UL (ref 4.1–5.7)
RBC MORPH BLD: NORMAL
SODIUM SERPL-SCNC: 138 MMOL/L (ref 136–145)
WBC # BLD AUTO: 6.6 K/UL (ref 4.1–11.1)
WBC #/AREA STL HPF: NORMAL /HPF (ref 0–4)

## 2025-05-14 PROCEDURE — 6360000002 HC RX W HCPCS: Performed by: INTERNAL MEDICINE

## 2025-05-14 PROCEDURE — 6370000000 HC RX 637 (ALT 250 FOR IP): Performed by: INTERNAL MEDICINE

## 2025-05-14 PROCEDURE — 85025 COMPLETE CBC W/AUTO DIFF WBC: CPT

## 2025-05-14 PROCEDURE — 99231 SBSQ HOSP IP/OBS SF/LOW 25: CPT | Performed by: NURSE PRACTITIONER

## 2025-05-14 PROCEDURE — 1200000000 HC SEMI PRIVATE

## 2025-05-14 PROCEDURE — 2500000003 HC RX 250 WO HCPCS: Performed by: INTERNAL MEDICINE

## 2025-05-14 PROCEDURE — 80048 BASIC METABOLIC PNL TOTAL CA: CPT

## 2025-05-14 RX ADMIN — METRONIDAZOLE 500 MG: 5 INJECTION, SOLUTION INTRAVENOUS at 19:10

## 2025-05-14 RX ADMIN — METRONIDAZOLE 500 MG: 5 INJECTION, SOLUTION INTRAVENOUS at 03:48

## 2025-05-14 RX ADMIN — Medication 10 ML: at 21:31

## 2025-05-14 RX ADMIN — METRONIDAZOLE 500 MG: 5 INJECTION, SOLUTION INTRAVENOUS at 12:47

## 2025-05-14 RX ADMIN — ESCITALOPRAM OXALATE 5 MG: 10 TABLET ORAL at 08:31

## 2025-05-14 RX ADMIN — Medication 10 ML: at 08:33

## 2025-05-14 RX ADMIN — CEFTRIAXONE SODIUM 1000 MG: 1 INJECTION, POWDER, FOR SOLUTION INTRAMUSCULAR; INTRAVENOUS at 19:10

## 2025-05-14 ASSESSMENT — PAIN SCALES - GENERAL: PAINLEVEL_OUTOF10: 0

## 2025-05-14 NOTE — PROGRESS NOTES
[facility]                                                                                          Hospitalist Progress Note  GUI Garcia - NP  Office Phone: [phone]        Date of Service:  2025  NAME:  Jude Mckeon  :  1965  MRN:  [id number]       Admission Summary:   Jude cMkeon is a 59 y.o. male with depression and h/o skin CA who presented with abdominal pain, fever, chills, nonbloody diarrhea x1 week. No previous episodes, sick contacts. He had a fever of 101 yesterday. 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 He had a normal colonoscopy 7 years ago at age 52. He denies headaches, lightheadedness, dizziness, fall, injuries, LOC, weight changes, CP, SOB, cough, dysuria.        Interval history / Subjective:   Repeat CT a/p tomorrow  Denies pain, tolerated full liquids  + flatus  Encourage ambulation  No new complaints     Assessment & Plan:     Diverticulitis of large intestine with abscess without bleeding  Leukocytosis  - General Surgery consulted by ED MD and recommended conservative management: no surgical intervention at this time  - prn pain and nausea meds  - advance to full liquids today   - BC 5/12 NG x 1 day  - s/p empiric Zosyn x1 in the ED  - start CTX/metronidazole x7 days  - fecal leuk pending  - Cdiff negative, FOBT negative, enteric panel negative  - will need repeat CT on 5/15 per surgery (ordered)      Hyponatremia - Resolved  - cont IVF  - improved to 135  - unlikely to be due to escitalopram but pt may need to d/w PCP     Depression  - continue home escitalopram      H/o skin CA          Code status: Full  Prophylaxis: SCD  Care Plan discussed with: patient, nursing, attending    Anticipated Disposition: home when improved, >48 hours as CT has been ordered for 5/15  Inpatient  Cardiac monitoring: None  Central Line:            Social Drivers of Health     Tobacco Use: High Risk (2023)    Patient History

## 2025-05-14 NOTE — CONSULTS
Nutrition Education    Educated on diverticulosis/diverticulitis   Learners: Patient  Readiness: Eager  Method: Explanation, Handout, and Teachback  Response: Verbalizes Understanding and Demonstrated Understanding  Contact name and number provided.    58 yo male admitted for diverticulitis of large intestine with abscess without bleeding, presented with lower abd pain, fevers, chills, diarrhea x 1 week. CT revealed sigmoid colonic diverticulitis. PMH of depression and skin ca.   Pt demonstrated understanding of following a high fiber diet when not having a flare up.     Mary Claudio RD  Contact via [employer]

## 2025-05-14 NOTE — PROGRESS NOTES
[facility]   Daily Progress Note    Admit Date: 2025  Post-Operative Day: * No surgery found * from * No surgery found *     Subjective:     Last 24 hrs: pt has no pain today; wbc nl and no fevers; slept a lot yesterday.        Objective:     Blood pressure 103/71, pulse 54, temperature 98.1 °F (36.7 °C), temperature source Oral, resp. rate 18, weight 81 kg (178 lb 9.2 oz), SpO2 96%.  Temp (24hrs), Av.9 °F (36.6 °C), Min:97.7 °F (36.5 °C), Max:98.2 °F (36.8 °C)      _____________________  Physical Exam:     Alert and Oriented, x3, in no acute distress.  Cardiovascular: RRR, no peripheral edema  Abdomen: NT, soft      Assessment:   Principal Problem:    Diverticulitis of large intestine with abscess without bleeding  Active Problems:    Diverticulitis large intestine  Resolved Problems:    * No resolved hospital problems. *          Plan:     Interval CT tomorrow  Cont iv abx  Cont full liquids    Data Review:    Recent Labs     25  1131 25  0434 25  0542   WBC 14.4* 14.5* 6.6   HGB 15.6 14.0 13.3   HCT 44.8 39.9 38.7    252 245     Recent Labs     25  1131 25  0434 25  0542   * 135* 138   K 3.8 3.9 4.6   CL 99 105 109*   CO2 29 23 20*   BUN 9 6 5*   ALT 21  --   --      Invalid input(s): \"AML\", \"LPSE\"        ______________________  Medications:    Current Facility-Administered Medications   Medication Dose Route Frequency    sodium chloride flush 0.9 % injection 5-40 mL  5-40 mL IntraVENous 2 times per day    sodium chloride flush 0.9 % injection 5-40 mL  5-40 mL IntraVENous PRN    0.9 % sodium chloride infusion   IntraVENous PRN    ondansetron (ZOFRAN) injection 4 mg  4 mg IntraVENous Q6H PRN    acetaminophen (TYLENOL) tablet 650 mg  650 mg Oral Q6H PRN    Or    acetaminophen (TYLENOL) suppository 650 mg  650 mg Rectal Q6H PRN    polyethylene glycol (GLYCOLAX) packet 17 g  17 g Oral Daily PRN    metroNIDAZOLE (FLAGYL) 500 mg in 0.9% NaCl 100 mL

## 2025-05-15 ENCOUNTER — APPOINTMENT (OUTPATIENT)
Facility: HOSPITAL | Age: 60
DRG: 392 | End: 2025-05-15
Payer: COMMERCIAL

## 2025-05-15 VITALS
SYSTOLIC BLOOD PRESSURE: 117 MMHG | OXYGEN SATURATION: 94 % | BODY MASS INDEX: 25.62 KG/M2 | RESPIRATION RATE: 17 BRPM | HEART RATE: 48 BPM | DIASTOLIC BLOOD PRESSURE: 65 MMHG | WEIGHT: 178.57 LBS | TEMPERATURE: 97.2 F

## 2025-05-15 LAB
ANION GAP SERPL CALC-SCNC: 7 MMOL/L (ref 2–12)
BASOPHILS # BLD: 0.07 K/UL (ref 0–0.1)
BASOPHILS NFR BLD: 1 % (ref 0–1)
BUN SERPL-MCNC: 7 MG/DL (ref 6–20)
BUN/CREAT SERPL: 8 (ref 12–20)
CALCIUM SERPL-MCNC: 9.4 MG/DL (ref 8.5–10.1)
CHLORIDE SERPL-SCNC: 106 MMOL/L (ref 97–108)
CO2 SERPL-SCNC: 25 MMOL/L (ref 21–32)
CREAT SERPL-MCNC: 0.9 MG/DL (ref 0.7–1.3)
DIFFERENTIAL METHOD BLD: NORMAL
EOSINOPHIL # BLD: 0.2 K/UL (ref 0–0.4)
EOSINOPHIL NFR BLD: 3 % (ref 0–7)
ERYTHROCYTE [DISTWIDTH] IN BLOOD BY AUTOMATED COUNT: 11.6 % (ref 11.5–14.5)
GLUCOSE SERPL-MCNC: 95 MG/DL (ref 65–100)
HCT VFR BLD AUTO: 40.3 % (ref 36.6–50.3)
HGB BLD-MCNC: 13.9 G/DL (ref 12.1–17)
IMM GRANULOCYTES # BLD AUTO: 0 K/UL
IMM GRANULOCYTES NFR BLD AUTO: 0 %
LYMPHOCYTES # BLD: 2.11 K/UL (ref 0.8–3.5)
LYMPHOCYTES NFR BLD: 32 % (ref 12–49)
MCH RBC QN AUTO: 29.4 PG (ref 26–34)
MCHC RBC AUTO-ENTMCNC: 34.5 G/DL (ref 30–36.5)
MCV RBC AUTO: 85.4 FL (ref 80–99)
MONOCYTES # BLD: 0.46 K/UL (ref 0–1)
MONOCYTES NFR BLD: 7 % (ref 5–13)
NEUTS BAND NFR BLD MANUAL: 3 % (ref 0–6)
NEUTS SEG # BLD: 3.76 K/UL (ref 1.8–8)
NEUTS SEG NFR BLD: 54 % (ref 32–75)
NRBC # BLD: 0 K/UL (ref 0–0.01)
NRBC BLD-RTO: 0 PER 100 WBC
PLATELET # BLD AUTO: 306 K/UL (ref 150–400)
PMV BLD AUTO: 9.7 FL (ref 8.9–12.9)
POTASSIUM SERPL-SCNC: 3.8 MMOL/L (ref 3.5–5.1)
RBC # BLD AUTO: 4.72 M/UL (ref 4.1–5.7)
RBC MORPH BLD: NORMAL
SODIUM SERPL-SCNC: 138 MMOL/L (ref 136–145)
WBC # BLD AUTO: 6.6 K/UL (ref 4.1–11.1)

## 2025-05-15 PROCEDURE — 6360000004 HC RX CONTRAST MEDICATION: Performed by: NURSE PRACTITIONER

## 2025-05-15 PROCEDURE — 99231 SBSQ HOSP IP/OBS SF/LOW 25: CPT

## 2025-05-15 PROCEDURE — 6360000002 HC RX W HCPCS: Performed by: INTERNAL MEDICINE

## 2025-05-15 PROCEDURE — 80048 BASIC METABOLIC PNL TOTAL CA: CPT

## 2025-05-15 PROCEDURE — 85025 COMPLETE CBC W/AUTO DIFF WBC: CPT

## 2025-05-15 PROCEDURE — 74177 CT ABD & PELVIS W/CONTRAST: CPT

## 2025-05-15 PROCEDURE — 6370000000 HC RX 637 (ALT 250 FOR IP): Performed by: INTERNAL MEDICINE

## 2025-05-15 RX ORDER — LEVOFLOXACIN 500 MG/1
500 TABLET, FILM COATED ORAL DAILY
Qty: 7 TABLET | Refills: 0 | Status: SHIPPED | OUTPATIENT
Start: 2025-05-15 | End: 2025-05-22

## 2025-05-15 RX ORDER — METRONIDAZOLE 500 MG/1
500 TABLET ORAL 3 TIMES DAILY
Qty: 21 TABLET | Refills: 0 | Status: SHIPPED | OUTPATIENT
Start: 2025-05-15 | End: 2025-05-22

## 2025-05-15 RX ORDER — LEVOFLOXACIN 500 MG/1
500 TABLET, FILM COATED ORAL DAILY
Qty: 4 TABLET | Refills: 0 | Status: SHIPPED | OUTPATIENT
Start: 2025-05-15 | End: 2025-05-15

## 2025-05-15 RX ORDER — METRONIDAZOLE 500 MG/1
500 TABLET ORAL 3 TIMES DAILY
Qty: 12 TABLET | Refills: 0 | Status: SHIPPED | OUTPATIENT
Start: 2025-05-15 | End: 2025-05-15

## 2025-05-15 RX ORDER — IOPAMIDOL 755 MG/ML
100 INJECTION, SOLUTION INTRAVASCULAR
Status: COMPLETED | OUTPATIENT
Start: 2025-05-15 | End: 2025-05-15

## 2025-05-15 RX ADMIN — ESCITALOPRAM OXALATE 5 MG: 10 TABLET ORAL at 09:09

## 2025-05-15 RX ADMIN — METRONIDAZOLE 500 MG: 5 INJECTION, SOLUTION INTRAVENOUS at 11:49

## 2025-05-15 RX ADMIN — IOPAMIDOL 100 ML: 755 INJECTION, SOLUTION INTRAVENOUS at 03:59

## 2025-05-15 RX ADMIN — METRONIDAZOLE 500 MG: 5 INJECTION, SOLUTION INTRAVENOUS at 04:25

## 2025-05-15 NOTE — DISCHARGE SUMMARY
Discharge Summary       PATIENT ID: Jude Mckeon  MRN: [id number]   YOB: 1965    DATE OF ADMISSION: 5/12/2025 11:40 AM    DATE OF DISCHARGE: 5/15/2025  PRIMARY CARE PROVIDER: Mejia Leonardo MD     ATTENDING PHYSICIAN: Dr. Ashby  DISCHARGING PROVIDER: GUI Garcia NP    To contact this individual call [phone] and ask the  to page.  If unavailable ask to be transferred the Adult Hospitalist Department.    CONSULTATIONS: IP CONSULT TO GENERAL SURGERY  IP CONSULT TO HOSPITALIST  IP CONSULT TO DIETITIAN    PROCEDURES/SURGERIES: * No surgery found *    ADMITTING DIAGNOSES & HOSPITAL COURSE:   Jude Mckeon is a 59 y.o. male with depression and h/o skin CA who presented with abdominal pain, fever, chills, nonbloody diarrhea x1 week. No previous episodes, sick contacts. He had a fever of 101 yesterday. 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 He had a normal colonoscopy 7 years ago at age 52. He denies headaches, lightheadedness, dizziness, fall, injuries, LOC, weight changes, CP, SOB, cough, dysuria.     5/15/2025  Repeat CT scan with improving abscess  Tolerated food  Discussed with general surgery  Medically stable for discharge      DISCHARGE DIAGNOSES / PLAN:      Diverticulitis of large intestine with abscess without bleeding  Leukocytosis  - General Surgery consulted by ED MD and recommended conservative management: no surgical intervention at this time  - prn pain and nausea meds  - advance to full liquids today   - BC 5/12 NG x 1 day  - s/p empiric Zosyn x1 in the ED  - start CTX/metronidazole x7 days  - fecal leuk pending  - Cdiff negative, FOBT negative, enteric panel negative  - will need repeat CT on 5/15 per surgery (ordered)      Hyponatremia - Resolved  - cont IVF  - improved to 135  - unlikely to be due to escitalopram but pt may need to d/w PCP     Depression  - continue home escitalopram      H/o skin CA           Code status: Full       PENDING TEST

## 2025-05-15 NOTE — DISCHARGE INSTRUCTIONS
Discharge Instructions       PATIENT ID: Jude Mckeon  MRN: [id number]   YOB: 1965    DATE OF ADMISSION: 5/12/2025   DATE OF DISCHARGE: 5/15/2025    PRIMARY CARE PROVIDER: Mejia Leonardo     ATTENDING PHYSICIAN: Yeni Ashby MD   DISCHARGING PROVIDER: GUI Garcia NP    To contact this individual call [phone] and ask the  to page.   If unavailable ask to be transferred the Adult Hospitalist Department.    DISCHARGE DIAGNOSES: Diverticulitis with abscess (improved)    CONSULTATIONS: general surgery    PROCEDURES/SURGERIES: * No surgery found *    PENDING TEST RESULTS:   At the time of discharge the following test results are still pending: none    FOLLOW UP APPOINTMENTS:    PCP    ADDITIONAL CARE RECOMMENDATIONS:   Take Levaquin and Flagyl for an additional 7 days to complete 10 day therapy    Follow-up with your PCP    Return for any fevers chills or severe abdominal pain    DIET: Low residue diet    ACTIVITY: activity as tolerated    WOUND CARE: None    EQUIPMENT needed: None      DISCHARGE MEDICATIONS:   See Medication Reconciliation Form    It is important that you take the medication exactly as they are prescribed.   Keep your medication in the bottles provided by the pharmacist and keep a list of the medication names, dosages, and times to be taken in your wallet.   Do not take other medications without consulting your doctor.       NOTIFY YOUR PHYSICIAN FOR ANY OF THE FOLLOWING:   Fever over 101 degrees for 24 hours.   Chest pain, shortness of breath, fever, chills, nausea, vomiting, diarrhea, change in mentation, falling, weakness, bleeding. Severe pain or pain not relieved by medications.  Or, any other signs or symptoms that you may have questions about.      DISPOSITION:  x  Home With:   OT  PT  HH  RN       SNF/Inpatient Rehab/LTAC    Independent/assisted living    Hospice    Other:     CDMP Checked:   Yes x     PROBLEM LIST Updated:  Yes x       Signed:

## 2025-05-15 NOTE — PROGRESS NOTES
General Surgery Daily Progress Note    Admit Date: 2025  Post-Operative Day: * No surgery found * from * No surgery found *     Subjective:       Last 24 hrs: pt sitting up in chair.  States he is feeling well. Denies any abdominal pain or n/v.  Repeat CT abd/pelvis showed sigmoid diverticulitis with improving diverticular abscess.  WBCs nl.        Objective:     Blood pressure 117/65, pulse (!) 48, temperature 97.2 °F (36.2 °C), temperature source Oral, resp. rate 17, weight 81 kg (178 lb 9.2 oz), SpO2 94%.  Temp (24hrs), Av.5 °F (36.4 °C), Min:97.2 °F (36.2 °C), Max:97.7 °F (36.5 °C)      _____________________  Physical Exam:     Alert and Oriented x 3, in no acute distress.  Cardiovascular: RRR, S1S2  Lungs:Unlabored  Abdomen: soft, ND, NT, no guarding or rebound.     Data Review:    Recent Labs     25  0434 25  0542 05/15/25  0253   WBC 14.5* 6.6 6.6   HGB 14.0 13.3 13.9   HCT 39.9 38.7 40.3    245 306     Recent Labs     25  1131 25  0434 25  0542 05/15/25  0253   * 135* 138 138   K 3.8 3.9 4.6 3.8   CL 99 105 109* 106   CO2 29 23 20* 25   BUN 9 6 5* 7   ALT 21  --   --   --      Invalid input(s): \"AML\", \"LPSE\"        ______________________  Medications:    Current Facility-Administered Medications   Medication Dose Route Frequency    sodium chloride flush 0.9 % injection 5-40 mL  5-40 mL IntraVENous 2 times per day    sodium chloride flush 0.9 % injection 5-40 mL  5-40 mL IntraVENous PRN    0.9 % sodium chloride infusion   IntraVENous PRN    ondansetron (ZOFRAN) injection 4 mg  4 mg IntraVENous Q6H PRN    acetaminophen (TYLENOL) tablet 650 mg  650 mg Oral Q6H PRN    Or    acetaminophen (TYLENOL) suppository 650 mg  650 mg Rectal Q6H PRN    polyethylene glycol (GLYCOLAX) packet 17 g  17 g Oral Daily PRN    metroNIDAZOLE (FLAGYL) 500 mg in 0.9% NaCl 100 mL IVPB premix  500 mg IntraVENous Q8H    cefTRIAXone (ROCEPHIN) 1,000 mg in sterile water 10 mL IV syringe

## 2025-05-17 LAB
BACTERIA SPEC CULT: NORMAL
BACTERIA SPEC CULT: NORMAL
SERVICE CMNT-IMP: NORMAL
SERVICE CMNT-IMP: NORMAL